# Patient Record
Sex: MALE | Race: WHITE | NOT HISPANIC OR LATINO | Employment: FULL TIME | URBAN - METROPOLITAN AREA
[De-identification: names, ages, dates, MRNs, and addresses within clinical notes are randomized per-mention and may not be internally consistent; named-entity substitution may affect disease eponyms.]

---

## 2024-08-29 ENCOUNTER — OFFICE VISIT (OUTPATIENT)
Dept: UROLOGY | Facility: CLINIC | Age: 39
End: 2024-08-29

## 2024-08-29 VITALS
BODY MASS INDEX: 21.58 KG/M2 | DIASTOLIC BLOOD PRESSURE: 84 MMHG | HEIGHT: 77 IN | HEART RATE: 73 BPM | SYSTOLIC BLOOD PRESSURE: 133 MMHG | WEIGHT: 182.75 LBS

## 2024-08-29 DIAGNOSIS — N20.1 RIGHT URETERAL CALCULUS: Primary | ICD-10-CM

## 2024-08-29 DIAGNOSIS — R10.9 RIGHT FLANK PAIN: ICD-10-CM

## 2024-08-29 DIAGNOSIS — R31.29 MICROSCOPIC HEMATURIA: ICD-10-CM

## 2024-08-29 DIAGNOSIS — N20.0 BILATERAL NEPHROLITHIASIS: ICD-10-CM

## 2024-08-29 LAB — SPECIMEN SOURCE: NORMAL

## 2024-08-29 PROCEDURE — 99999 PR PBB SHADOW E&M-EST. PATIENT-LVL III: CPT | Mod: PBBFAC,,, | Performed by: NURSE PRACTITIONER

## 2024-08-29 PROCEDURE — 82365 CALCULUS SPECTROSCOPY: CPT | Performed by: NURSE PRACTITIONER

## 2024-08-29 PROCEDURE — 99204 OFFICE O/P NEW MOD 45 MIN: CPT | Mod: S$PBB,,, | Performed by: NURSE PRACTITIONER

## 2024-08-29 PROCEDURE — 99213 OFFICE O/P EST LOW 20 MIN: CPT | Mod: PBBFAC,PO | Performed by: NURSE PRACTITIONER

## 2024-08-29 NOTE — PROGRESS NOTES
Subjective:       Patient ID: Ana Lay is a 38 y.o. male.    Chief Complaint: Nephrolithiasis    Patient is new to me. He is a 37 yo male who is here today for an ER follow-up for a 4 mm right ureter calculus with hydronephrosis that was noted on CT performed in the ER on 8/21/2024. Bilateral punctate renal stones noted in each kidney. Results discussed with patient. Patient reports passing right ureter stone yesterday. He brought stone to clinic today for analysis. He denies pain or difficulty voiding at this time.     Follow-up  This is a new (right ureter calculus) problem. Episode onset: 8/21/2024. The problem has been resolved. Pertinent negatives include no abdominal pain, change in bowel habit, chills (resolved), fever, nausea (resolved), swollen glands, urinary symptoms, vomiting or weakness. Nothing aggravates the symptoms. Treatments tried: tamsulosin and increasing water intake. The treatment provided significant relief.     Review of Systems   Constitutional:  Positive for appetite change (decreased). Negative for chills (resolved) and fever.   Gastrointestinal:  Negative for abdominal pain, change in bowel habit, constipation, diarrhea, nausea (resolved) and vomiting.   Genitourinary:  Negative for decreased urine volume, difficulty urinating, discharge, dysuria, flank pain (right; resolved), frequency, hematuria, penile pain, penile swelling, scrotal swelling, testicular pain and urgency.   Neurological: Negative.  Negative for weakness.   Psychiatric/Behavioral: Negative.           Objective:      Physical Exam  Vitals and nursing note reviewed.   Constitutional:       General: He is not in acute distress.     Appearance: He is well-developed and normal weight. He is not ill-appearing.   HENT:      Head: Normocephalic and atraumatic.   Eyes:      Pupils: Pupils are equal, round, and reactive to light.   Cardiovascular:      Rate and Rhythm: Normal rate.   Pulmonary:      Effort: Pulmonary  effort is normal. No respiratory distress.   Abdominal:      Palpations: Abdomen is soft.      Tenderness: There is no abdominal tenderness.   Musculoskeletal:         General: Normal range of motion.      Cervical back: Normal range of motion.   Skin:     General: Skin is warm and dry.   Neurological:      Mental Status: He is alert and oriented to person, place, and time.      Coordination: Coordination normal.   Psychiatric:         Mood and Affect: Mood normal.         Behavior: Behavior normal.         Thought Content: Thought content normal.         Judgment: Judgment normal.         Assessment:       Problem List Items Addressed This Visit    None  Visit Diagnoses       Right ureteral calculus    -  Primary    Relevant Orders    Urinary Stone Analysis    Right flank pain        Microscopic hematuria        Bilateral nephrolithiasis                Plan:           Ana was seen today for nephrolithiasis.    Diagnoses and all orders for this visit:    Right ureteral calculus  -     Urinary Stone Analysis    Right flank pain    Microscopic hematuria    Bilateral nephrolithiasis    Follow-up in 1 year for monitoring of bilateral renal stones.     Donovan Castano, DNP

## 2024-09-06 ENCOUNTER — TELEPHONE (OUTPATIENT)
Dept: UROLOGY | Facility: CLINIC | Age: 39
End: 2024-09-06

## 2024-09-06 NOTE — TELEPHONE ENCOUNTER
----- Message from Donovan Castano DNP sent at 9/6/2024 11:14 AM CDT -----  Please inform patient via telephone that his urinary stone analysis showed stone was made up of 100% calcium oxalate monohydrate. Low oxalate diet and increasing water intake is recommended. Please provide patient with a sample food list (things to avoid) for educational purposes in hopes of stone prevention. Thanks.

## 2024-12-03 ENCOUNTER — OCCUPATIONAL HEALTH (OUTPATIENT)
Dept: URGENT CARE | Facility: CLINIC | Age: 39
End: 2024-12-03

## 2024-12-03 DIAGNOSIS — Z02.1 PRE-EMPLOYMENT EXAMINATION: Primary | ICD-10-CM

## 2024-12-03 LAB
CTP QC/QA: YES
FECAL OCCULT BLOOD, POC: NEGATIVE

## 2024-12-05 ENCOUNTER — TELEPHONE (OUTPATIENT)
Dept: URGENT CARE | Facility: CLINIC | Age: 39
End: 2024-12-05

## 2024-12-05 ENCOUNTER — OCCUPATIONAL HEALTH (OUTPATIENT)
Dept: URGENT CARE | Facility: CLINIC | Age: 39
End: 2024-12-05

## 2024-12-05 DIAGNOSIS — Z02.1 PRE-EMPLOYMENT EXAMINATION: Primary | ICD-10-CM

## 2024-12-05 LAB
TB INDURATION 48 - 72 HR READ: 0 MM
TB SKIN TEST 48 - 72 HR READ: NEGATIVE